# Patient Record
Sex: MALE | Race: BLACK OR AFRICAN AMERICAN | URBAN - METROPOLITAN AREA
[De-identification: names, ages, dates, MRNs, and addresses within clinical notes are randomized per-mention and may not be internally consistent; named-entity substitution may affect disease eponyms.]

---

## 2018-12-26 ENCOUNTER — APPOINTMENT (OUTPATIENT)
Age: 53
Setting detail: DERMATOLOGY
End: 2018-12-28

## 2018-12-26 VITALS
HEART RATE: 81 BPM | HEIGHT: 71 IN | WEIGHT: 160 LBS | DIASTOLIC BLOOD PRESSURE: 92 MMHG | SYSTOLIC BLOOD PRESSURE: 141 MMHG

## 2018-12-26 DIAGNOSIS — B36.0 PITYRIASIS VERSICOLOR: ICD-10-CM

## 2018-12-26 PROBLEM — L08.9 LOCAL INFECTION OF THE SKIN AND SUBCUTANEOUS TISSUE, UNSPECIFIED: Status: ACTIVE | Noted: 2018-12-26

## 2018-12-26 PROCEDURE — OTHER MIPS QUALITY: OTHER

## 2018-12-26 PROCEDURE — 99202 OFFICE O/P NEW SF 15 MIN: CPT

## 2018-12-26 PROCEDURE — OTHER PRESCRIPTION: OTHER

## 2018-12-26 PROCEDURE — OTHER COUNSELING: OTHER

## 2018-12-26 PROCEDURE — OTHER PATIENT SPECIFIC COUNSELING: OTHER

## 2018-12-26 RX ORDER — KETOCONAZOLE 20.5 MG/ML
SHAMPOO, SUSPENSION TOPICAL
Qty: 1 | Refills: 3 | Status: ERX | COMMUNITY
Start: 2018-12-26

## 2018-12-26 RX ORDER — KETOCONAZOLE 20 MG/G
CREAM TOPICAL BID
Qty: 1 | Refills: 3 | Status: ERX | COMMUNITY
Start: 2018-12-26

## 2018-12-26 ASSESSMENT — LOCATION SIMPLE DESCRIPTION DERM
LOCATION SIMPLE: LEFT ELBOW
LOCATION SIMPLE: LEFT KNEE
LOCATION SIMPLE: ABDOMEN

## 2018-12-26 ASSESSMENT — LOCATION DETAILED DESCRIPTION DERM
LOCATION DETAILED: PERIUMBILICAL SKIN
LOCATION DETAILED: LEFT KNEE
LOCATION DETAILED: LEFT ELBOW

## 2018-12-26 ASSESSMENT — LOCATION ZONE DERM
LOCATION ZONE: ARM
LOCATION ZONE: TRUNK
LOCATION ZONE: LEG

## 2018-12-26 ASSESSMENT — BSA RASH: BSA RASH: 20

## 2018-12-26 ASSESSMENT — SEVERITY ASSESSMENT: SEVERITY: MILD

## 2018-12-26 NOTE — HPI: RASH
What Type Of Note Output Would You Prefer (Optional)?: Standard Output
How Severe Is Your Rash?: mild
Is This A New Presentation, Or A Follow-Up?: Rash
Additional History: Pain 0/10
Additional History: Pain 0/10.

## 2018-12-26 NOTE — PROCEDURE: PATIENT SPECIFIC COUNSELING
Explained that this is more likely tinea versicolor than vitiligo as vitiligo generally appears bilaterally. Explained etiology of both conditions with the patient. Discussed treatment options including performing a biopsy today or first treating with topical anti fungals and re evaluate in one month. The patient is agreeable and if condition is not improved in one month, a punch biopsy can be performed to definitively diagnose. The patient is instructed to apply ketoconazole 2% cream twice daily to affected areas as well as ketoconazole 2% shampoo once daily. The patient is agreeable.
Detail Level: Simple